# Patient Record
Sex: FEMALE | Race: WHITE | NOT HISPANIC OR LATINO | Employment: FULL TIME | URBAN - METROPOLITAN AREA
[De-identification: names, ages, dates, MRNs, and addresses within clinical notes are randomized per-mention and may not be internally consistent; named-entity substitution may affect disease eponyms.]

---

## 2024-01-10 ENCOUNTER — TELEPHONE (OUTPATIENT)
Dept: ORTHOPEDIC SURGERY | Facility: CLINIC | Age: 39
End: 2024-01-10
Payer: COMMERCIAL

## 2024-01-10 NOTE — TELEPHONE ENCOUNTER
DX Plantar fasciitis of right foot , DX Osteochondroma of right tibia .  PATIENT STATES SHE WAS DX WITH OSTEOCHONDROMA IN 2001 WHEN SHE WAS 16 .  NO RECENT TREATMENT - PATIENT HAS XRAY FROM 2001 ON DISC.  PATIENT STATES SHE HASN'T HAD ANY PROBLEM UNTIL RECENTLY AND THINKS SHE IS READY TO GO FORWARD WITH HAVING THE OSTEOCHONDROMA REMOVED.  PATIENT ONLY HAS PLANTAR FASCIITIS ON RIGHT FOOT AND FEELS IS IT CAUSED BY OSTEOCHONDROMA.

## 2024-03-14 ENCOUNTER — TRANSCRIBE ORDERS (OUTPATIENT)
Dept: ADMINISTRATIVE | Facility: HOSPITAL | Age: 39
End: 2024-03-14
Payer: COMMERCIAL

## 2024-03-14 DIAGNOSIS — C41.9: Primary | ICD-10-CM

## 2024-03-20 ENCOUNTER — HOSPITAL ENCOUNTER (OUTPATIENT)
Dept: PET IMAGING | Facility: HOSPITAL | Age: 39
Discharge: HOME | End: 2024-03-20
Payer: COMMERCIAL

## 2024-03-20 VITALS — WEIGHT: 205 LBS

## 2024-03-20 DIAGNOSIS — C41.9: ICD-10-CM

## 2024-03-20 LAB — GLUCOSE BLDC GLUCOMTR-MCNC: 79 MG/DL (ref 70–130)

## 2024-03-20 PROCEDURE — 82948 REAGENT STRIP/BLOOD GLUCOSE: CPT

## 2024-03-20 PROCEDURE — 0 FLUDEOXYGLUCOSE F18 SOLUTION: Performed by: ORTHOPAEDIC SURGERY

## 2024-03-20 PROCEDURE — 78816 PET IMAGE W/CT FULL BODY: CPT

## 2024-03-20 PROCEDURE — A9552 F18 FDG: HCPCS | Performed by: ORTHOPAEDIC SURGERY

## 2024-03-20 RX ADMIN — FLUDEOXYGLUCOSE F 18 1 DOSE: 200 INJECTION, SOLUTION INTRAVENOUS at 06:50
